# Patient Record
Sex: FEMALE | Race: WHITE | NOT HISPANIC OR LATINO | Employment: OTHER | URBAN - METROPOLITAN AREA
[De-identification: names, ages, dates, MRNs, and addresses within clinical notes are randomized per-mention and may not be internally consistent; named-entity substitution may affect disease eponyms.]

---

## 2020-03-12 PROBLEM — K59.00 CONSTIPATION: Status: ACTIVE | Noted: 2020-03-12

## 2021-05-10 ENCOUNTER — PATIENT MESSAGE (OUTPATIENT)
Dept: RESEARCH | Facility: HOSPITAL | Age: 31
End: 2021-05-10

## 2023-09-03 PROBLEM — O03.9 FETAL DEMISE DUE TO MISCARRIAGE: Status: ACTIVE | Noted: 2023-09-03

## 2023-09-12 ENCOUNTER — OFFICE VISIT (OUTPATIENT)
Dept: NEUROSURGERY | Facility: CLINIC | Age: 33
End: 2023-09-12
Payer: COMMERCIAL

## 2023-09-12 VITALS
RESPIRATION RATE: 18 BRPM | SYSTOLIC BLOOD PRESSURE: 121 MMHG | HEART RATE: 88 BPM | HEIGHT: 60 IN | DIASTOLIC BLOOD PRESSURE: 76 MMHG | BODY MASS INDEX: 22.58 KG/M2 | WEIGHT: 115 LBS

## 2023-09-12 DIAGNOSIS — R55 SYNCOPE AND COLLAPSE: Primary | ICD-10-CM

## 2023-09-12 PROCEDURE — 3074F PR MOST RECENT SYSTOLIC BLOOD PRESSURE < 130 MM HG: ICD-10-PCS | Mod: CPTII,S$GLB,, | Performed by: NEUROLOGICAL SURGERY

## 2023-09-12 PROCEDURE — 99204 PR OFFICE/OUTPT VISIT, NEW, LEVL IV, 45-59 MIN: ICD-10-PCS | Mod: S$GLB,,, | Performed by: NEUROLOGICAL SURGERY

## 2023-09-12 PROCEDURE — 3008F BODY MASS INDEX DOCD: CPT | Mod: CPTII,S$GLB,, | Performed by: NEUROLOGICAL SURGERY

## 2023-09-12 PROCEDURE — 3078F PR MOST RECENT DIASTOLIC BLOOD PRESSURE < 80 MM HG: ICD-10-PCS | Mod: CPTII,S$GLB,, | Performed by: NEUROLOGICAL SURGERY

## 2023-09-12 PROCEDURE — 1159F MED LIST DOCD IN RCRD: CPT | Mod: CPTII,S$GLB,, | Performed by: NEUROLOGICAL SURGERY

## 2023-09-12 PROCEDURE — 99204 OFFICE O/P NEW MOD 45 MIN: CPT | Mod: S$GLB,,, | Performed by: NEUROLOGICAL SURGERY

## 2023-09-12 PROCEDURE — 3008F PR BODY MASS INDEX (BMI) DOCUMENTED: ICD-10-PCS | Mod: CPTII,S$GLB,, | Performed by: NEUROLOGICAL SURGERY

## 2023-09-12 PROCEDURE — 1159F PR MEDICATION LIST DOCUMENTED IN MEDICAL RECORD: ICD-10-PCS | Mod: CPTII,S$GLB,, | Performed by: NEUROLOGICAL SURGERY

## 2023-09-12 PROCEDURE — 3078F DIAST BP <80 MM HG: CPT | Mod: CPTII,S$GLB,, | Performed by: NEUROLOGICAL SURGERY

## 2023-09-12 PROCEDURE — 3074F SYST BP LT 130 MM HG: CPT | Mod: CPTII,S$GLB,, | Performed by: NEUROLOGICAL SURGERY

## 2023-09-12 RX ORDER — AMOXICILLIN AND CLAVULANATE POTASSIUM 875; 125 MG/1; MG/1
1 TABLET, FILM COATED ORAL EVERY 12 HOURS
COMMUNITY
Start: 2023-09-06

## 2023-09-12 RX ORDER — CLONAZEPAM 1 MG/1
TABLET ORAL
COMMUNITY

## 2023-09-12 NOTE — PROGRESS NOTES
Neurosurgery History & Physical    Patient ID: Zelda Aguilera is a 33 y.o. female.    Chief Complaint   Patient presents with    Establish Care     Patient present today as establish care; review imaging        HPI:  Ms. Aguilera is a 33-year-old female with history cervical dissection requiring stenting.  She is not had this looked at since 2020 and wanted to establish care with a local physician.  She reports today to establish care.  She has no recent imaging.  She denies any recent neck pain or neurologic changes.    Past Medical History:   Diagnosis Date    Anemia, unspecified     Cerebral aneurysm without rupture 2014    Constipation     Generalized anxiety disorder      Social History     Socioeconomic History    Marital status:    Tobacco Use    Smoking status: Former     Types: Cigarettes    Smokeless tobacco: Never   Substance and Sexual Activity    Alcohol use: Not Currently     Alcohol/week: 1.0 standard drink of alcohol     Types: 1 Glasses of wine per week     Comment: social     Drug use: Not Currently    Sexual activity: Yes     Partners: Male     Birth control/protection: OCP     Social Determinants of Health     Stress: No Stress Concern Present (10/21/2020)    Citizen of [place] [employer] [facility] - Occupational Stress Questionnaire     Feeling of Stress : Not at all     Family History   Problem Relation Age of Onset    Hypertension Mother      Review of patient's allergies indicates:  No Known Allergies    Current Outpatient Medications:     amoxicillin-clavulanate 875-125mg (AUGMENTIN) 875-125 mg per tablet, Take 1 tablet by mouth every 12 (twelve) hours., Disp: , Rfl:     clonazePAM (KLONOPIN) 1 MG tablet, TAKE 1 TABLET BY MOUTH 3 TIMES A DAY AS NEEDED ANXIETY, Disp: , Rfl:     hydrOXYzine pamoate (VISTARIL) 50 MG Cap, Take 1 capsule (50 mg total) by mouth every 4 (four) hours as needed., Disp: 60 capsule, Rfl: 1    ibuprofen (ADVIL,MOTRIN) 800 MG tablet, Take 1 tablet (800 mg  total) by mouth 3 (three) times daily., Disp: 20 tablet, Rfl: 1  Blood pressure 121/76, pulse 88, resp. rate 18, height 5' (1.524 m), weight 52.2 kg (115 lb).      Neurologic Exam     Mental Status   Oriented to person, place, and time.   Attention: normal.   Speech: speech is normal   Level of consciousness: alert  Knowledge: good.     Cranial Nerves     CN III, IV, VI   Extraocular motions are normal.     CN VII   Facial expression full, symmetric.     Motor Exam   Muscle bulk: normal  Overall muscle tone: normal    Strength   Strength 5/5 throughout.     Sensory Exam   Light touch normal.     Gait, Coordination, and Reflexes     Gait  Gait: normal    Coordination   Romberg: negative      Physical Exam  Eyes:      Extraocular Movements: EOM normal.   Neurological:      Mental Status: She is oriented to person, place, and time.      Motor: Motor strength is normal.     Coordination: Romberg Test normal.      Gait: Gait is intact.   Psychiatric:         Speech: Speech normal.         Imaging:  CT angiogram of the neck dated 10/22/2020 is personally reviewed and discussed with the patient.  There is a stent in the left internal carotid artery in the cervical segment.  The stent is widely patent.    Assessment/Plan:   Mr. Aguilera is a 33-year-old female with a  Plan is to follow-up with an MRA of the neck with and without contrast  We will reach out with the patient and call with next step after review of imaging

## 2023-09-19 ENCOUNTER — TELEPHONE (OUTPATIENT)
Dept: NEUROSURGERY | Facility: CLINIC | Age: 33
End: 2023-09-19
Payer: COMMERCIAL

## 2023-09-19 NOTE — TELEPHONE ENCOUNTER
Called patient to schedule. Patient will contact insurance to discuss cost and will call back to schedule.

## 2023-09-24 ENCOUNTER — PATIENT MESSAGE (OUTPATIENT)
Dept: NEUROSURGERY | Facility: CLINIC | Age: 33
End: 2023-09-24
Payer: COMMERCIAL

## 2024-05-10 DIAGNOSIS — R51.9 ACUTE NONINTRACTABLE HEADACHE, UNSPECIFIED HEADACHE TYPE: Primary | ICD-10-CM

## 2024-05-14 ENCOUNTER — TELEPHONE (OUTPATIENT)
Dept: NEUROSURGERY | Facility: CLINIC | Age: 34
End: 2024-05-14
Payer: COMMERCIAL

## 2024-05-14 NOTE — TELEPHONE ENCOUNTER
----- Message from Teetee Urrutia NP sent at 5/10/2024  1:38 PM CDT -----  Regarding: MRA  Hi Kia!    Ms. Aguilera was last seen by Dr. Johns on 09/19/23 who ordered a MRA neck with and without contrast.  It appears that she did not get the imaging at that time, possibly due to insurance coverage or cost.  Can we try and get it scheduled for her now?  I am also ordering a CT head without contrast. If her insurance will not cover the cost of the MRA, I would at least like to get the CT head.      Thank you,  Anitra  ----- Message -----  From: Ivone Vee  Sent: 5/10/2024   1:09 PM CDT  To: Teetee Urrutia NP    Good afternoon!  Pt has been seen here before 6 months ago for syncope. Please advise.    Thank you,  Ivone LEE  ----- Message -----  From: Deep Hernandez  Sent: 5/10/2024  12:00 PM CDT  To: Andreas RIBEIRO Staff    Type: Needs Medical Advice    Who Called:  Pt    Symptoms (please be specific):  headaches, fatigue, history of cerebral aneurism, pressure in head    How long has patient had these symptoms:  worsened over past 2 months    Best Call Back Number: [phone]    Additional Information: Pt requesting further test due to symptoms, up to  Would like to speak with nurse or  Please call back to advise, Thanks!

## 2024-05-14 NOTE — TELEPHONE ENCOUNTER
"Spoke with patient. Patient has had some additional s/s. C/o Frontal lobe headaches, pressure behind the ears, brain fog, fatigue, visual changes, and "feels like fluid on the brain." Patient was supposed to have imaging in September but never called back to schedule. Patient advised that she requires imaging to be completed with follow up. Referral and Auth initiated for [facility] per patient request. Advised Imaging with need to be delivered to clinic with report. States understanding. Patient advised if s/s increase or worsen then she should reports to the ER as soon  as possible. States understanding.  "

## 2024-05-30 ENCOUNTER — TELEPHONE (OUTPATIENT)
Dept: NEUROSURGERY | Facility: CLINIC | Age: 34
End: 2024-05-30
Payer: COMMERCIAL

## 2024-05-30 NOTE — TELEPHONE ENCOUNTER
----- Message from Michelle Guillermo sent at 5/30/2024 12:55 PM CDT -----  Type:  Needs Medical Advice    Who Called:  Pt    Would the patient rather a call back or a response via [facility]?  Call back    Best Call Back Number:  [phone]    Additional Information:  Pt is asking to speak with someone about PA for Ct scan and MRA.  Please call back to advise. Thanks!

## 2024-06-24 ENCOUNTER — TELEPHONE (OUTPATIENT)
Dept: NEUROSURGERY | Facility: CLINIC | Age: 34
End: 2024-06-24
Payer: COMMERCIAL

## 2024-06-24 NOTE — TELEPHONE ENCOUNTER
----- Message from Kia Burns LPN sent at 6/24/2024 11:10 AM CDT -----    ----- Message -----  From: Kia Burns LPN  Sent: 6/19/2024   4:38 PM CDT  To: Kia Burns LPN      ----- Message -----  From: Malik Ragsdale  Sent: 6/19/2024  12:44 PM CDT  To: Andreas RIBEIRO Staff    Type: Needs Medical Advice  Who Called:  [facility]   Best Call Back Number: [phone]  Additional Information: [facility] is calling the office regarding a order that was sent over to their clinic has no signature.Please call back and advise.